# Patient Record
Sex: FEMALE | Race: WHITE | NOT HISPANIC OR LATINO | Employment: OTHER | URBAN - METROPOLITAN AREA
[De-identification: names, ages, dates, MRNs, and addresses within clinical notes are randomized per-mention and may not be internally consistent; named-entity substitution may affect disease eponyms.]

---

## 2017-04-19 PROBLEM — M19.049 CMC DJD(CARPOMETACARPAL DEGENERATIVE JOINT DISEASE), LOCALIZED PRIMARY: Status: ACTIVE | Noted: 2017-04-19

## 2017-10-25 PROBLEM — M20.5X1 CROSSOVER TOE DEFORMITY OF RIGHT FOOT: Status: ACTIVE | Noted: 2017-10-25

## 2019-05-12 PROBLEM — N17.9 AKI (ACUTE KIDNEY INJURY): Status: ACTIVE | Noted: 2019-05-12

## 2019-05-12 PROBLEM — R79.89 ELEVATED TROPONIN: Status: ACTIVE | Noted: 2019-05-12

## 2019-05-12 PROBLEM — R65.20 SEVERE SEPSIS: Status: ACTIVE | Noted: 2019-05-12

## 2019-05-12 PROBLEM — A41.9 SEVERE SEPSIS: Status: ACTIVE | Noted: 2019-05-12

## 2019-05-12 PROBLEM — N30.00 ACUTE CYSTITIS WITHOUT HEMATURIA: Status: ACTIVE | Noted: 2019-05-12

## 2019-05-12 PROBLEM — T68.XXXA HYPOTHERMIA: Status: ACTIVE | Noted: 2019-05-12

## 2019-05-12 PROBLEM — R79.89 ELEVATED LFTS: Status: ACTIVE | Noted: 2019-05-12

## 2019-07-10 PROBLEM — L02.91 ABSCESS: Status: ACTIVE | Noted: 2019-07-10

## 2019-07-13 PROBLEM — I82.4Y3 ACUTE DEEP VEIN THROMBOSIS (DVT) OF PROXIMAL VEIN OF BOTH LOWER EXTREMITIES: Status: ACTIVE | Noted: 2019-07-13

## 2020-01-07 ENCOUNTER — TELEPHONE (OUTPATIENT)
Dept: HOME HEALTH SERVICES | Facility: HOSPITAL | Age: 70
End: 2020-01-07

## 2020-01-14 ENCOUNTER — EXTERNAL HOME HEALTH (OUTPATIENT)
Dept: HOME HEALTH SERVICES | Facility: HOSPITAL | Age: 70
End: 2020-01-14

## 2020-01-20 PROBLEM — T14.8XXA WOUND INFECTION: Status: ACTIVE | Noted: 2020-01-20

## 2020-01-20 PROBLEM — L08.9 WOUND INFECTION: Status: ACTIVE | Noted: 2020-01-20

## 2020-03-09 ENCOUNTER — TELEPHONE (OUTPATIENT)
Dept: HOME HEALTH SERVICES | Facility: HOSPITAL | Age: 70
End: 2020-03-09

## 2020-04-08 ENCOUNTER — DOCUMENT SCAN (OUTPATIENT)
Dept: HOME HEALTH SERVICES | Facility: HOSPITAL | Age: 70
End: 2020-04-08

## 2020-04-29 ENCOUNTER — DOCUMENT SCAN (OUTPATIENT)
Dept: HOME HEALTH SERVICES | Facility: HOSPITAL | Age: 70
End: 2020-04-29

## 2020-05-06 ENCOUNTER — DOCUMENT SCAN (OUTPATIENT)
Dept: HOME HEALTH SERVICES | Facility: HOSPITAL | Age: 70
End: 2020-05-06

## 2020-05-07 ENCOUNTER — EXTERNAL HOME HEALTH (OUTPATIENT)
Dept: HOME HEALTH SERVICES | Facility: HOSPITAL | Age: 70
End: 2020-05-07

## 2020-07-15 ENCOUNTER — EXTERNAL HOME HEALTH (OUTPATIENT)
Dept: HOME HEALTH SERVICES | Facility: HOSPITAL | Age: 70
End: 2020-07-15

## 2020-08-10 PROBLEM — Z71.89 ACP (ADVANCE CARE PLANNING): Status: ACTIVE | Noted: 2020-08-10

## 2020-08-10 PROBLEM — I48.20 ATRIAL FIBRILLATION, CHRONIC: Status: ACTIVE | Noted: 2020-08-10

## 2020-08-10 PROBLEM — I63.9 CVA (CEREBRAL VASCULAR ACCIDENT): Status: ACTIVE | Noted: 2020-08-10

## 2020-08-10 PROBLEM — R41.82 ALTERED MENTAL STATUS: Status: ACTIVE | Noted: 2020-08-10

## 2020-08-10 PROBLEM — S31.000A SACRAL WOUND: Status: ACTIVE | Noted: 2020-08-10

## 2020-08-10 PROBLEM — R44.3 HALLUCINATIONS: Status: ACTIVE | Noted: 2020-08-10

## 2020-08-12 PROBLEM — I82.409 DVT (DEEP VENOUS THROMBOSIS): Status: ACTIVE | Noted: 2020-08-12

## 2020-08-13 DIAGNOSIS — M85.50 ANEURYSMAL BONE CYST, UNSPECIFIED SITE: ICD-10-CM

## 2020-08-20 ENCOUNTER — TELEPHONE (OUTPATIENT)
Dept: NEUROLOGY | Facility: CLINIC | Age: 70
End: 2020-08-20

## 2020-08-21 ENCOUNTER — TELEPHONE (OUTPATIENT)
Dept: NEUROLOGY | Facility: CLINIC | Age: 70
End: 2020-08-21

## 2020-08-21 NOTE — TELEPHONE ENCOUNTER
Attempted to contact patient to reschedule her appointment to 08/24/2020 to the afternoon. Per Yi she is rounding in the hospital with Dr. Zuniga.

## 2020-08-24 ENCOUNTER — TELEPHONE (OUTPATIENT)
Dept: NEUROSURGERY | Facility: CLINIC | Age: 70
End: 2020-08-24

## 2020-08-24 PROBLEM — I67.1 ANEURYSM OF INTERNAL CAROTID ARTERY: Status: ACTIVE | Noted: 2020-08-24

## 2020-08-24 NOTE — TELEPHONE ENCOUNTER
----- Message from Moira Ferguson NP sent at 8/13/2020  1:49 PM CDT -----  She needs a CTA head and neck in 6 months. Dx ICA aneurysm. F/u after imaging. Thanks!

## 2020-08-26 PROBLEM — Z71.89 ACP (ADVANCE CARE PLANNING): Status: RESOLVED | Noted: 2020-08-10 | Resolved: 2020-08-26

## 2020-08-31 ENCOUNTER — EXTERNAL HOME HEALTH (OUTPATIENT)
Dept: HOME HEALTH SERVICES | Facility: HOSPITAL | Age: 70
End: 2020-08-31

## 2020-10-27 ENCOUNTER — EXTERNAL HOME HEALTH (OUTPATIENT)
Dept: HOME HEALTH SERVICES | Facility: HOSPITAL | Age: 70
End: 2020-10-27

## 2020-12-22 ENCOUNTER — EXTERNAL HOME HEALTH (OUTPATIENT)
Dept: HOME HEALTH SERVICES | Facility: HOSPITAL | Age: 70
End: 2020-12-22

## 2021-03-03 ENCOUNTER — PATIENT OUTREACH (OUTPATIENT)
Dept: HOME HEALTH SERVICES | Facility: HOSPITAL | Age: 71
End: 2021-03-03

## 2021-03-04 ENCOUNTER — EXTERNAL HOME HEALTH (OUTPATIENT)
Dept: HOME HEALTH SERVICES | Facility: HOSPITAL | Age: 71
End: 2021-03-04

## 2021-04-19 ENCOUNTER — PATIENT OUTREACH (OUTPATIENT)
Dept: HOME HEALTH SERVICES | Facility: HOSPITAL | Age: 71
End: 2021-04-19

## 2021-04-20 ENCOUNTER — EXTERNAL HOME HEALTH (OUTPATIENT)
Dept: HOME HEALTH SERVICES | Facility: HOSPITAL | Age: 71
End: 2021-04-20

## 2021-06-22 ENCOUNTER — PATIENT OUTREACH (OUTPATIENT)
Dept: HOME HEALTH SERVICES | Facility: HOSPITAL | Age: 71
End: 2021-06-22

## 2021-06-23 ENCOUNTER — EXTERNAL HOME HEALTH (OUTPATIENT)
Dept: HOME HEALTH SERVICES | Facility: HOSPITAL | Age: 71
End: 2021-06-23

## 2023-10-21 PROBLEM — I82.411 ACUTE DEEP VEIN THROMBOSIS (DVT) OF FEMORAL VEIN OF RIGHT LOWER EXTREMITY: Status: ACTIVE | Noted: 2023-10-21

## 2023-10-21 PROBLEM — R55 SYNCOPE: Status: ACTIVE | Noted: 2023-10-21

## 2023-10-21 PROBLEM — I82.432 ACUTE DEEP VEIN THROMBOSIS (DVT) OF POPLITEAL VEIN OF LEFT LOWER EXTREMITY: Status: ACTIVE | Noted: 2023-10-21

## 2023-11-15 ENCOUNTER — TELEPHONE (OUTPATIENT)
Dept: FAMILY MEDICINE | Facility: CLINIC | Age: 73
End: 2023-11-15
Payer: MEDICARE

## 2024-01-07 PROBLEM — E87.20 LACTIC ACIDOSIS: Status: ACTIVE | Noted: 2024-01-07

## 2024-01-07 PROBLEM — E86.0 DEHYDRATION: Status: ACTIVE | Noted: 2024-01-07

## 2024-01-07 PROBLEM — I95.9 HYPOTENSION: Status: ACTIVE | Noted: 2024-01-07

## 2024-01-07 PROBLEM — R09.02 HYPOXIA: Status: ACTIVE | Noted: 2024-01-07

## 2024-01-09 PROBLEM — I95.9 HYPOTENSION: Status: RESOLVED | Noted: 2024-01-07 | Resolved: 2024-01-09

## 2024-01-09 PROBLEM — E87.20 LACTIC ACIDOSIS: Status: RESOLVED | Noted: 2024-01-07 | Resolved: 2024-01-09

## 2024-09-25 PROBLEM — L02.91 ABSCESS: Status: RESOLVED | Noted: 2019-07-10 | Resolved: 2024-09-25

## 2024-09-25 PROBLEM — T68.XXXA HYPOTHERMIA: Status: RESOLVED | Noted: 2019-05-12 | Resolved: 2024-09-25

## 2024-09-25 PROBLEM — R79.89 ELEVATED TROPONIN: Status: RESOLVED | Noted: 2019-05-12 | Resolved: 2024-09-25

## 2024-09-25 PROBLEM — L08.9 WOUND INFECTION: Status: RESOLVED | Noted: 2020-01-20 | Resolved: 2024-09-25

## 2024-09-25 PROBLEM — N18.2 STAGE 2 CHRONIC KIDNEY DISEASE: Status: ACTIVE | Noted: 2019-12-03

## 2024-09-25 PROBLEM — N17.9 AKI (ACUTE KIDNEY INJURY): Status: RESOLVED | Noted: 2019-05-12 | Resolved: 2024-09-25

## 2024-09-25 PROBLEM — F32.9 MAJOR DEPRESSIVE DISORDER, SINGLE EPISODE, UNSPECIFIED: Status: ACTIVE | Noted: 2019-08-02

## 2024-09-25 PROBLEM — I25.10 ATHEROSCLEROTIC HEART DISEASE OF NATIVE CORONARY ARTERY WITHOUT ANGINA PECTORIS: Status: ACTIVE | Noted: 2024-01-11

## 2024-09-25 PROBLEM — R41.82 ALTERED MENTAL STATUS: Status: RESOLVED | Noted: 2020-08-10 | Resolved: 2024-09-25

## 2024-09-25 PROBLEM — S31.000A SACRAL WOUND: Status: RESOLVED | Noted: 2020-08-10 | Resolved: 2024-09-25

## 2024-09-25 PROBLEM — I63.9 CVA (CEREBRAL VASCULAR ACCIDENT): Status: ACTIVE | Noted: 2023-11-01

## 2024-09-25 PROBLEM — G89.29 CHRONIC LOW BACK PAIN: Status: ACTIVE | Noted: 2020-12-13

## 2024-09-25 PROBLEM — I50.32 CHRONIC HEART FAILURE WITH PRESERVED EJECTION FRACTION: Status: ACTIVE | Noted: 2023-10-22

## 2024-09-25 PROBLEM — A41.9 SEVERE SEPSIS: Status: RESOLVED | Noted: 2019-05-12 | Resolved: 2024-09-25

## 2024-09-25 PROBLEM — R55 SYNCOPE: Status: RESOLVED | Noted: 2023-10-21 | Resolved: 2024-09-25

## 2024-09-25 PROBLEM — F80.1 EXPRESSIVE LANGUAGE DISORDER: Status: ACTIVE | Noted: 2024-01-11

## 2024-09-25 PROBLEM — D69.6 THROMBOCYTOPENIC DISORDER: Status: ACTIVE | Noted: 2019-12-03

## 2024-09-25 PROBLEM — N30.00 ACUTE CYSTITIS WITHOUT HEMATURIA: Status: RESOLVED | Noted: 2019-05-12 | Resolved: 2024-09-25

## 2024-09-25 PROBLEM — M54.50 CHRONIC LOW BACK PAIN: Status: ACTIVE | Noted: 2020-12-13

## 2024-09-25 PROBLEM — Z86.718 PERSONAL HISTORY OF OTHER VENOUS THROMBOSIS AND EMBOLISM: Status: ACTIVE | Noted: 2019-08-02

## 2024-09-25 PROBLEM — R44.3 HALLUCINATIONS: Status: RESOLVED | Noted: 2020-08-10 | Resolved: 2024-09-25

## 2024-09-25 PROBLEM — R65.20 SEVERE SEPSIS: Status: RESOLVED | Noted: 2019-05-12 | Resolved: 2024-09-25

## 2024-09-25 PROBLEM — G43.909 MIGRAINE: Status: ACTIVE | Noted: 2020-11-12

## 2024-09-25 PROBLEM — T14.8XXA WOUND INFECTION: Status: RESOLVED | Noted: 2020-01-20 | Resolved: 2024-09-25

## 2024-09-25 PROBLEM — R09.02 HYPOXIA: Status: RESOLVED | Noted: 2024-01-07 | Resolved: 2024-09-25

## 2024-10-02 ENCOUNTER — DOCUMENT SCAN (OUTPATIENT)
Dept: HOME HEALTH SERVICES | Facility: HOSPITAL | Age: 74
End: 2024-10-02
Payer: MEDICARE

## 2024-10-02 PROBLEM — I82.432 ACUTE DEEP VEIN THROMBOSIS (DVT) OF POPLITEAL VEIN OF LEFT LOWER EXTREMITY: Status: RESOLVED | Noted: 2023-10-21 | Resolved: 2024-10-02

## 2024-10-02 PROBLEM — D69.6 THROMBOCYTOPENIC DISORDER: Status: RESOLVED | Noted: 2019-12-03 | Resolved: 2024-10-02

## 2024-10-02 PROBLEM — I82.411 ACUTE DEEP VEIN THROMBOSIS (DVT) OF FEMORAL VEIN OF RIGHT LOWER EXTREMITY: Status: RESOLVED | Noted: 2023-10-21 | Resolved: 2024-10-02

## 2024-10-02 PROBLEM — I82.4Y3 ACUTE DEEP VEIN THROMBOSIS (DVT) OF PROXIMAL VEIN OF BOTH LOWER EXTREMITIES: Status: RESOLVED | Noted: 2019-07-13 | Resolved: 2024-10-02

## 2024-11-04 NOTE — TELEPHONE ENCOUNTER
----- Message from Zach Godfrey sent at 11/15/2023 10:33 AM CST -----  Type:  Sooner Appointment Request    Caller is requesting a sooner appointment.  Caller declined first available appointment listed below.  Caller will not accept being placed on the waitlist and is requesting a message be sent to doctor.    Name of Caller:  [place]/ [facility]  When is the first available appointment?  Out of Template  Symptoms:  Establish care  Would the patient rather a call back or a response via [facility]? Call   Best Call Back Number:   Jenna-- [phone]  Additional Information:         Medication:DULoxetine (CYMBALTA) 20 MG capsule   passed protocol.   Last office visit date: 08-  Next appointment scheduled?: No;   Number of refills given: 0

## 2025-03-20 ENCOUNTER — OUTPATIENT CASE MANAGEMENT (OUTPATIENT)
Dept: ADMINISTRATIVE | Facility: OTHER | Age: 75
End: 2025-03-20
Payer: MEDICARE

## 2025-03-24 ENCOUNTER — OUTPATIENT CASE MANAGEMENT (OUTPATIENT)
Dept: ADMINISTRATIVE | Facility: OTHER | Age: 75
End: 2025-03-24
Payer: MEDICARE

## 2025-03-24 NOTE — LETTER
Ofelia Langford  [id number] [facility] [address]      Dear Ofelia Langford,     I work with [facility] Outpatient Care Management Department. We received a referral to call you to discuss your medical history. These services are free of charge and are offered to [facility] patients who have recently been discharged from any of our facilities or who have medical conditions that may require the skill of a nurse to assist with management.     I am a Registered Nurse who specializes in connecting patients with available medical and financial resources as well as addressing any educational needs that may be indicated.    I attempted to reach you by telephone, but I was unsuccessful. Please call our department so that we can go over some questions with you, regarding your health.    The Outpatient Care Management Department can be reached at [phone], from 8:00AM to 4:30 PM, on Monday thru Friday.     Additionally, [facility] also has a program where a nurse is available 24/7 to answer questions or provide medical advice, their number is [phone].      Thanks,        Nicolasa Samuels RN  Outpatient Care Management  Phone #: [phone]

## 2025-03-24 NOTE — PROGRESS NOTES
3/24/2025  2nd attempt to complete Initial Assessment  for Outpatient Care Management, left message.  Will send via portal -  unable to assess letter.

## 2025-04-03 ENCOUNTER — OUTPATIENT CASE MANAGEMENT (OUTPATIENT)
Dept: ADMINISTRATIVE | Facility: OTHER | Age: 75
End: 2025-04-03
Payer: MEDICARE